# Patient Record
Sex: FEMALE | Race: WHITE | NOT HISPANIC OR LATINO | Employment: FULL TIME | URBAN - METROPOLITAN AREA
[De-identification: names, ages, dates, MRNs, and addresses within clinical notes are randomized per-mention and may not be internally consistent; named-entity substitution may affect disease eponyms.]

---

## 2020-04-16 ENCOUNTER — TRANSFERRED RECORDS (OUTPATIENT)
Dept: HEALTH INFORMATION MANAGEMENT | Facility: CLINIC | Age: 30
End: 2020-04-16
Payer: COMMERCIAL

## 2022-02-11 ENCOUNTER — TRANSFERRED RECORDS (OUTPATIENT)
Dept: HEALTH INFORMATION MANAGEMENT | Facility: CLINIC | Age: 32
End: 2022-02-11

## 2022-02-23 ENCOUNTER — MEDICAL CORRESPONDENCE (OUTPATIENT)
Dept: HEALTH INFORMATION MANAGEMENT | Facility: CLINIC | Age: 32
End: 2022-02-23

## 2022-05-05 ENCOUNTER — TRANSFERRED RECORDS (OUTPATIENT)
Dept: HEALTH INFORMATION MANAGEMENT | Facility: CLINIC | Age: 32
End: 2022-05-05
Payer: COMMERCIAL

## 2022-05-25 ENCOUNTER — MEDICAL CORRESPONDENCE (OUTPATIENT)
Dept: HEALTH INFORMATION MANAGEMENT | Facility: CLINIC | Age: 32
End: 2022-05-25
Payer: COMMERCIAL

## 2022-05-26 NOTE — TELEPHONE ENCOUNTER
DIAGNOSIS: pt looking for 2nd opinion for stenosis/ pertrusion/ self ref/ images at [facility] done 02/11/2022 / rederral being faxed   APPOINTMENT DATE: 7.21.22   NOTES STATUS DETAILS   OFFICE NOTE from referring provider Care Everywhere    OPERATIVE REPORT Care Everywhere 6.8.22 - scheduled open left L4-5, L5-S1 hemilamectomy and discectomy      MEDICATION LIST Care Everywhere    LABS     CBC/DIFF Care Everywhere    MRI PACS 2.11.22 lumbar spine, [facility]  4.16.20 lumbar spine, [facility]   CT SCAN PACS 4.16.20 lumbar spine, [facility]   XRAYS (IMAGES & REPORTS) PACS 4.17.20 lumbar spine, [facility]     Action 5.26.22 12:50 PM N[facility]   Action Taken Faxed request to [facility] [phone], [facility] [phone]  and [phone] for images

## 2022-05-31 ENCOUNTER — MEDICAL CORRESPONDENCE (OUTPATIENT)
Dept: HEALTH INFORMATION MANAGEMENT | Facility: CLINIC | Age: 32
End: 2022-05-31
Payer: COMMERCIAL

## 2022-06-01 ENCOUNTER — TRANSCRIBE ORDERS (OUTPATIENT)
Dept: OTHER | Age: 32
End: 2022-06-01
Payer: COMMERCIAL

## 2022-06-01 DIAGNOSIS — M54.50 CHRONIC BILATERAL LOW BACK PAIN WITHOUT SCIATICA: Primary | ICD-10-CM

## 2022-06-01 DIAGNOSIS — G89.29 CHRONIC BILATERAL LOW BACK PAIN WITHOUT SCIATICA: Primary | ICD-10-CM

## 2022-06-07 ENCOUNTER — TRANSFERRED RECORDS (OUTPATIENT)
Dept: HEALTH INFORMATION MANAGEMENT | Facility: CLINIC | Age: 32
End: 2022-06-07
Payer: COMMERCIAL

## 2022-07-15 DIAGNOSIS — M54.50 LOW BACK PAIN: Primary | ICD-10-CM

## 2022-07-21 ENCOUNTER — OFFICE VISIT (OUTPATIENT)
Dept: ORTHOPEDICS | Facility: CLINIC | Age: 32
End: 2022-07-21
Payer: COMMERCIAL

## 2022-07-21 ENCOUNTER — PRE VISIT (OUTPATIENT)
Dept: ORTHOPEDICS | Facility: CLINIC | Age: 32
End: 2022-07-21

## 2022-07-21 VITALS — WEIGHT: 255 LBS | HEIGHT: 71 IN | BODY MASS INDEX: 35.7 KG/M2

## 2022-07-21 DIAGNOSIS — G89.29 CHRONIC BILATERAL LOW BACK PAIN WITH SCIATICA, SCIATICA LATERALITY UNSPECIFIED: ICD-10-CM

## 2022-07-21 DIAGNOSIS — M54.40 CHRONIC BILATERAL LOW BACK PAIN WITH SCIATICA, SCIATICA LATERALITY UNSPECIFIED: ICD-10-CM

## 2022-07-21 PROCEDURE — 99203 OFFICE O/P NEW LOW 30 MIN: CPT | Performed by: ORTHOPAEDIC SURGERY

## 2022-07-21 RX ORDER — OXYCODONE AND ACETAMINOPHEN 5; 325 MG/1; MG/1
TABLET ORAL
COMMUNITY
Start: 2022-03-02

## 2022-07-21 RX ORDER — MELOXICAM 15 MG/1
15 TABLET ORAL
COMMUNITY
Start: 2022-03-11

## 2022-07-21 RX ORDER — ACETAMINOPHEN 500 MG
1000 TABLET ORAL
COMMUNITY

## 2022-07-21 RX ORDER — BACLOFEN 10 MG/1
10 TABLET ORAL
COMMUNITY
Start: 2022-05-31
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
## 2022-07-24 ENCOUNTER — HEALTH MAINTENANCE LETTER (OUTPATIENT)
Age: 32
End: 2022-07-24

## 2022-10-03 ENCOUNTER — HEALTH MAINTENANCE LETTER (OUTPATIENT)
Age: 32
End: 2022-10-03

## 2023-03-22 ENCOUNTER — MEDICAL CORRESPONDENCE (OUTPATIENT)
Dept: HEALTH INFORMATION MANAGEMENT | Facility: CLINIC | Age: 33
End: 2023-03-22
Payer: COMMERCIAL

## 2023-03-22 ENCOUNTER — TRANSFERRED RECORDS (OUTPATIENT)
Dept: HEALTH INFORMATION MANAGEMENT | Facility: CLINIC | Age: 33
End: 2023-03-22
Payer: COMMERCIAL

## 2023-03-27 ENCOUNTER — TELEPHONE (OUTPATIENT)
Dept: NEUROSURGERY | Facility: CLINIC | Age: 33
End: 2023-03-27
Payer: COMMERCIAL

## 2023-03-27 ENCOUNTER — TRANSCRIBE ORDERS (OUTPATIENT)
Dept: OTHER | Age: 33
End: 2023-03-27

## 2023-03-27 DIAGNOSIS — M54.16 LUMBAR RADICULOPATHY: ICD-10-CM

## 2023-03-27 DIAGNOSIS — M96.1 FAILED BACK SYNDROME: Primary | ICD-10-CM

## 2023-03-27 NOTE — TELEPHONE ENCOUNTER
Referred by Daniel Ash @ [facility]      [phone]   Fax [phone]     Please be aware that coverage of these services is subject to the terms and limitations of your health insurance plan.  Call member services at your health plan with any benefit or coverage questions.   Please call to schedule your appointment             Order Questions    Question Answer   Preferred Location: [facility]   Scheduling Instructions: Please call to schedule your appointment   My Clinical Question Is: Chronic low back pain with radiculopathy to the left   Additional Information: Left L4-L5 hemilaminectomy and diskectomy and a left L5-S1 hemilaminectomy and kiskectomy, microdissection on 7/25/22. After the procedure she had an infection and needed a reexploration and drainage due to infection on 8/8/22

## 2023-03-29 NOTE — TELEPHONE ENCOUNTER
I called [facility] and they are pushing Images they have,  I also called [facility] and requested all spine images

## 2023-04-05 ENCOUNTER — TELEPHONE (OUTPATIENT)
Dept: NEUROSURGERY | Facility: CLINIC | Age: 33
End: 2023-04-05
Payer: COMMERCIAL

## 2023-04-10 NOTE — TELEPHONE ENCOUNTER
Action 4/10/23 MV 1.15pm   Action Taken Imaging request faxed to [facility]     Action 4/11/23 MV 1.44pm   Action Taken Pt cancelled appt           SPINE PATIENTS - NEW PROTOCOL PREVISIT    RECORDS RECEIVED FROM: external   REASON FOR VISIT: Failed back syndrome/ Lumbar radiculopathy   Date of Appt: 4/14/23   NOTES (FOR ALL VISITS) STATUS DETAILS   OFFICE NOTE from referring provider Care Everywhere Dr Daniel Ash @ [facility]:  3/22/23   OFFICE NOTE from other specialist Care Everywhere Susna Tobias NP @ [facility]:  1/24/23    Maura ANTOINE @ [facility]:  12/30/22 9/23/22    Ramiro ANTOINE @ [facility]:  5/20/22    Dr Blas Lopez @ [facility]:  3/12/19  1/22/19  12/20/18    Ana ANTOINE @ [facility]:  12/5/18   DISCHARGE REPORT from ER Care Everywhere [facility]:  8/7/22 7/27/22    [facility]:  12/3/18  11/15/18   OPERATIVE REPORT Care Everywhere [facility]:  8/8/22  Irrigation and Debridement of Lumbar Wound, Re-Exploration of L4-5, L5-S1 Discs    [facility]:  7/25/22  Open Left L4-5, L5-S1 Hemilamectomy and Discectomy    [facility]:  4/17/20  Left L4-5 Microdiscectomy   EMG REPORT Care Everywhere [facility]:  4/27/22   MEDICATION LIST Care Everywhere    IMAGING  (FOR ALL VISITS)     MRI (HEAD, NECK, SPINE) In process [facility]:  MRI Lumbar Spine 10/18/22  MRI Lumbar Spine 2/11/22  MRI Lumbar Spine 4/16/20    [facility]:  MRI Lumbar Spine 7/27/22    [facility]:  MRI Lumbar Spine 11/15/18   XRAY (SPINE) *NEUROSURGERY* In process [facility]:  XR Lumbar Spine 7/25/22  XR Lumbar Spine 4/17/20    [facility]:  XR Lumbar Spine 7/21/22    [facility]:  XR Lumbar Spine 3/12/19  XR Lumbar Spine 12/20/18   CT (HEAD, NECK, SPINE) PACS [facility]:  CT Lumbar Spine 4/16/20

## 2023-04-14 ENCOUNTER — PRE VISIT (OUTPATIENT)
Dept: NEUROSURGERY | Facility: CLINIC | Age: 33
End: 2023-04-14

## 2024-05-19 ENCOUNTER — HEALTH MAINTENANCE LETTER (OUTPATIENT)
Age: 34
End: 2024-05-19

## 2025-06-08 ENCOUNTER — HEALTH MAINTENANCE LETTER (OUTPATIENT)
Age: 35
End: 2025-06-08